# Patient Record
(demographics unavailable — no encounter records)

---

## 2024-11-14 NOTE — ASSESSMENT
[FreeTextEntry1] : Patient is a 69 M with history of decreased hearing and tinnitus here for pituitary adenoma follow up.  # Nonfunctioning Pituitary macroadenoma - Imaging: sellar/suprasellar mass measuring 1.3 x 2.0 x 1.5 cm (AP by transverse by craniocaudad) mass compresses/distorts the optic chiasm. There is extension towards the left cavernous sinus. Deviation of the pituitary infundibulum towards the right in 11/28/2022. Repeat imaging in 03/22/2023 shows mostly stable mass 2.4x1.0x1.4 cm. Repeat imaging in 11/8/2023 shows stable mass 1.1x2.1 x1.5 cm consistent with macroadenoma. Repeat imaging 11/2024 shows stable 1.1 x 2.1 x 1.1 cm suprasellar mass with mild stable deviation of infundibulum and stable elevation and compression of optic chiasm  - Visual field testing: normal in 11/2024 - Clinically: does not appear to have hormonal hypo or hypersecretion - Pituitary labs: all wnl 11/2023, repeat ordered for this year   - Repeat MRI 11/2025 - Patient debating whether he should have the surgery now. We did discuss the risks and benefits of surgery. Potential risks include hormonal deficiencies after surgery that may require lifelong replacement vs continued surveillance.   # HTN - BP elevated during clinic visit but reported at home 110-120/70-80 - Continue with Amlodipine  5 mg daily - Advised to check BP daily at home  # HLD - , , HDL 49, , non  - ASCVD risk is 29.2 for 10 years - Prescribed atorvastatin 20 mg QHS but patient not taking  - Repeat lipid profile today   RTC in 12 months  Daily Tinoco MD Endocrinology, Diabetes and Metabolism [address] [place] [address]. [address] [place] [address] Tel [phone] Fax [phone]

## 2024-11-14 NOTE — HISTORY OF PRESENT ILLNESS
[FreeTextEntry1] : Patient is a 68 M with history of decreased hearing and tinnitus here for pituitary adenoma follow up. Patient last seen me in 05/2024 Patient here with his wife today.   FH: mother has diabetes. daughter also has a pituitary mass.  SH: Used to smoke, quit in 1980s. Social alcohol use. Retired, used to be a . Denies history of radiation therapy.   # Pituitary incidentaloma  Patient saw Dr. Khan for hearing loss and tinnitus and underwent MRI brain. MRI brain shows sellar/suprasellar mass measuring 1.3 x 2.0 x 1.5 cm (AP by transverse by craniocaudad) mass compresses/distorts the optic chiasm. There is extension towards the left cavernous sinus. Deviation of the pituitary infundibulum towards the right. Findings likely represent a macroadenoma.   Repeat MRI 03/2023 shows 2.4x1.0x1.4 cm pituitary macroadenoma, impinging upon the overlying optic chiasm.   11/8/2023 MRI Similar appearing 1.1 x 2.1 x 1.5 cm (maximal AP x TV x CC dimensions) hypoenhancing sellar and suprasellar lesion consistent with the presence of a macroadenoma.  11/2024 MRI  Stable hypoenhancing sellar/suprasellar mass measuring 1.1 x 2.1 x 1.1 cm. Mild stable deviation of the infundibulum towards the right. Stable elevation and compression of the optic chiasm. Stable extension towards the cavernous sinuses left greater than right.  Symptoms of over or under secretion of pituitary hormones: denies fatigue, weight loss, nausea, vomiting or diarrhea. Denies weight gain, stretch marks or easy bruising, history of diabetes or HTN. Denies history of osteoporosis, fractures or falls. Denies hair or skin changes, palpitations, heat or cold intolerance. Denies galactorrhea, breast engorgement. Denies change in size of hands, shoes, or hat. Sexual drive is so so, a little lower than before. Have 1 daughter and 1 son.   Associated visual or neurological symptoms: denies blurry vision, double vision. Denies diminished vision in the temporal fields. Denies headache.    12/2022 pituitary panel  ACTH 30.2 AM cortisol 15.5--> AM cortisol wnl, rules out adrenal insufficiency  24 hour urine free cortisol 39--> wnl, rule out cushing disease  FSH 13.5 LH 2.0 Testosterone, free 5.6 Testosterone total 275--> free testosterone is not reliable, Total testosterone wnl.  FT4 0.9 and TSH 3.02--> rules out hypothyroidism  IGF-1 77--> wnl  Macroprolactin 14.9--> wnl    # -120/70-77 at home  On amlodipine 5 mg daily   Says that he has white coat syndrome BP in office usually 160-200 SBP  12/22/2022 visit event: patient overall feels pretty healthy. He says he feels nervous when he sees doctors. Wife works in the MRI office.   4/21/2023 visit events: having trouble staying asleep at night. Prescribed mirtazapine helping a little. Also takes melatonin. Denies vision changes. Denies appetite change. Denies significant weight change. Denies muscle loss, denies fatigue. Denies myalgia. . Feels like his sex drive has decreased a little. Still having morning erections, but less than before. No family history of prostate cancer. Has history of elevated PSA, last level 18.    11/9/2023 visit events: overall doing fine. Denies significant weight changes. Denies significant fatigue. Following with urology about PSA elevation.   11/2023 labs  Cr 0.99, GFR 83 ACTH 27.8, cortisol 13.5 FT4 0.9, TSH 2.75 IGF-1 143 LH 3.3, FSH 13.4, testosterone 355 prolactin diluted 14.4 , , HDL 49, , non --> ASCVD risk is 29.2 for 10 years, start atorvastatin 20 mg QHS A1C 6.2  5/16/2024 visit events: on amlodipine 10 mg daily. BP at home sometimes 104/70. Patient is endorsing visual floaters then accompanied by one sided headache for the past few months.   11/14/2024 visit events: feels fine. On Amlodipine 5 mg daily. BP at home 110-120/70-80. No blurry vision or double vision. No peripheral visual deficit. Recently went to see optho and visual field testing normal. Denies persistent headache that doesnt go away

## 2024-11-25 NOTE — REASON FOR VISIT
[Subsequent Evaluation] : a subsequent evaluation for [Tinnitus] : tinnitus [FreeTextEntry2] : dizzy

## 2024-11-25 NOTE — HISTORY OF PRESENT ILLNESS
[No] : patient does not have a  history of radiation therapy [Tinnitus] : tinnitus [Hearing Loss] : hearing loss [Presbycusis] : presbycusis [Loud Noise Exposure] : history of loud noise exposure [None] : No associated symptoms are reported. [de-identified] : 68 yo male\par  Patient complains of decreased hearing and tinnitus left ear x 10 year. States he was exposed to loud noises at work, he was a . Tinnitus is a constant high pitched ringing worse when its quite. Pt has no ear pain, ear drainage, vertigo, nasal congestion, nasal discharge, epistaxis, sinus infections, facial pain, facial pressure, throat pain, dysphagia or fevers\par  \par   [FreeTextEntry1] : 11/25/2025 L dizziness x seconds w/ assoc tinnitus no other modifying factors no other nasal or throat complaints [Smoking] : no smoking [Early Onset Hearing Loss] : no early onset hearing loss [Stroke] : no stroke

## 2024-11-25 NOTE — PROCEDURE
[Risk and Benefits Discussed] : The purpose, risks, discomforts, benefits and alternatives of the procedure have been explained to the patient including no treatment. [Same] : same as the Pre Op Dx. [] : Epley Maneuver [FreeTextEntry6] : Left BPPV classic responses

## 2024-11-25 NOTE — HISTORY OF PRESENT ILLNESS
[No] : patient does not have a  history of radiation therapy [Tinnitus] : tinnitus [Hearing Loss] : hearing loss [Presbycusis] : presbycusis [Loud Noise Exposure] : history of loud noise exposure [None] : No associated symptoms are reported. [de-identified] : 66 yo male\par  Patient complains of decreased hearing and tinnitus left ear x 10 year. States he was exposed to loud noises at work, he was a . Tinnitus is a constant high pitched ringing worse when its quite. Pt has no ear pain, ear drainage, vertigo, nasal congestion, nasal discharge, epistaxis, sinus infections, facial pain, facial pressure, throat pain, dysphagia or fevers\par  \par   [FreeTextEntry1] : 11/25/2025 L dizziness x seconds w/ assoc tinnitus no other modifying factors no other nasal or throat complaints [Smoking] : no smoking [Early Onset Hearing Loss] : no early onset hearing loss [Stroke] : no stroke